# Patient Record
Sex: MALE | Race: WHITE | NOT HISPANIC OR LATINO | Employment: UNEMPLOYED | URBAN - METROPOLITAN AREA
[De-identification: names, ages, dates, MRNs, and addresses within clinical notes are randomized per-mention and may not be internally consistent; named-entity substitution may affect disease eponyms.]

---

## 2022-02-07 ENCOUNTER — HISTORICAL (OUTPATIENT)
Dept: SURGERY | Facility: HOSPITAL | Age: 67
End: 2022-02-07

## 2022-02-09 ENCOUNTER — HISTORICAL (OUTPATIENT)
Dept: ANESTHESIOLOGY | Facility: HOSPITAL | Age: 67
End: 2022-02-09

## 2022-05-14 NOTE — OP NOTE
DATE OF SURGERY:        SURGEON:  Marquis Chew M.D.    PREOPERATIVE DIAGNOSIS:  Trigger thumb, right.    POSTOPERATIVE DIAGNOSIS:  Trigger thumb, right.    PROCEDURE PERFORMED:  Trigger finger release.    ANESTHESIA:  Local.    DESCRIPTION OF PROCEDURE:  Patient was brought to the OR, given IV sedation.  Prepped and draped.  A volar injection was made over the A1 pulley of the right thumb.  The Esmarch was used and tourniquet inflated to 250 mmHg.  Transverse incision was made over the A1 pulley under direct visualization.  Released the A1 pulley longitudinally.  Following this, the patient could actively flex and extend the finger with no further catching.  The wound was irrigated.  Skin was closed with nylon.  Sterile dressing applied.  No complication.        CHARLIE/KEN   DD: 02/09/2022 0718   DT: 02/09/2022 0722  Job # [id number]

## 2022-12-27 DIAGNOSIS — R13.10 PROBLEMS WITH SWALLOWING AND MASTICATION: Primary | ICD-10-CM

## 2023-01-10 ENCOUNTER — HOSPITAL ENCOUNTER (OUTPATIENT)
Dept: RADIOLOGY | Facility: HOSPITAL | Age: 68
Discharge: HOME | End: 2023-01-10
Attending: SURGERY
Payer: MEDICARE

## 2023-01-10 DIAGNOSIS — R13.10 PROBLEMS WITH SWALLOWING AND MASTICATION: ICD-10-CM

## 2023-01-10 PROCEDURE — 74248 X-RAY SM INT F-THRU STD: CPT | Mod: TC

## 2023-01-10 PROCEDURE — A9698 NON-RAD CONTRAST MATERIALNOC: HCPCS | Performed by: SURGERY

## 2023-01-10 PROCEDURE — 25500020 PHARM REV CODE 255: Performed by: SURGERY

## 2023-01-10 PROCEDURE — 74240 X-RAY XM UPR GI TRC 1CNTRST: CPT | Mod: TC

## 2023-01-10 RX ADMIN — BARIUM SULFATE 135 ML: 980 POWDER, FOR SUSPENSION ORAL at 09:01
